# Patient Record
Sex: FEMALE | Employment: FULL TIME | URBAN - METROPOLITAN AREA
[De-identification: names, ages, dates, MRNs, and addresses within clinical notes are randomized per-mention and may not be internally consistent; named-entity substitution may affect disease eponyms.]

---

## 2019-10-01 ENCOUNTER — OFFICE VISIT (OUTPATIENT)
Dept: INTERNAL MEDICINE | Facility: CLINIC | Age: 49
End: 2019-10-01
Payer: OTHER GOVERNMENT

## 2019-10-01 VITALS
SYSTOLIC BLOOD PRESSURE: 121 MMHG | TEMPERATURE: 98 F | OXYGEN SATURATION: 99 % | HEIGHT: 66 IN | DIASTOLIC BLOOD PRESSURE: 79 MMHG | WEIGHT: 160 LBS | BODY MASS INDEX: 25.71 KG/M2 | HEART RATE: 75 BPM

## 2019-10-01 DIAGNOSIS — Z00.00 ROUTINE GENERAL MEDICAL EXAMINATION AT A HEALTH CARE FACILITY: Primary | ICD-10-CM

## 2019-10-01 DIAGNOSIS — F32.1 CURRENT MODERATE EPISODE OF MAJOR DEPRESSIVE DISORDER, UNSPECIFIED WHETHER RECURRENT: ICD-10-CM

## 2019-10-01 DIAGNOSIS — Z23 NEED FOR SHINGLES VACCINE: ICD-10-CM

## 2019-10-01 DIAGNOSIS — Z23 NEED FOR TDAP VACCINATION: ICD-10-CM

## 2019-10-01 PROCEDURE — 99386 PR PREVENTIVE VISIT,NEW,40-64: ICD-10-PCS | Mod: S$GLB,,, | Performed by: INTERNAL MEDICINE

## 2019-10-01 PROCEDURE — 99386 PREV VISIT NEW AGE 40-64: CPT | Mod: S$GLB,,, | Performed by: INTERNAL MEDICINE

## 2019-10-01 RX ORDER — CITALOPRAM 20 MG/1
20 TABLET, FILM COATED ORAL DAILY
Qty: 30 TABLET | Refills: 11 | Status: SHIPPED | OUTPATIENT
Start: 2019-10-01 | End: 2020-09-30

## 2019-10-01 NOTE — PROGRESS NOTES
Subjective:      Patient ID: Leonila Castelan is a 49 y.o. female.    Chief Complaint: Establish Care    HPI: patient with no significant past medical history is here to establish care and for wellness exam.     Patient report h/o thyroid problem for which she was given medication long ago. Patient does not remember what the problem was. Patient reports weight gain 10lbs  in a year, no dry skin, has hair loss, has constipation, no cold intolerance, occasional fatigue. Has poor sleep. Patient goes to bed at 10pm and wakes up 4:30. Patient reports she has nights when she sofi not sleep the whole night and then is tiered the rest of the day.     Patient reports depression x 6 years after loosing her son, who committed suicide and shot himself. Patient reports feeling depressed most of the days He was depressed. Patient has crying spells, lack of energy/interest, feeling guilt and worthlessness, No suicidal or homicidal ideation. Poor sleep but ok appetite. Patient reports decreased sexual desire and its effecting her relationships. Denies anxiety.    Review of Systems   Constitutional: Negative for chills, diaphoresis, fever, malaise/fatigue and weight loss.   HENT: Negative for congestion, ear pain, sinus pain, sore throat and tinnitus.    Eyes: Negative for blurred vision and photophobia.   Respiratory: Negative for cough, hemoptysis, shortness of breath and wheezing.    Cardiovascular: Negative for chest pain, palpitations, orthopnea, leg swelling and PND.   Gastrointestinal: Positive for constipation. Negative for abdominal pain, blood in stool, diarrhea, heartburn, melena, nausea and vomiting.   Genitourinary: Negative for dysuria, frequency and urgency.   Musculoskeletal: Negative for back pain, myalgias and neck pain.   Skin: Negative for rash.   Neurological: Negative for dizziness, tremors, seizures, loss of consciousness and weakness.   Endo/Heme/Allergies: Negative for polydipsia.   Psychiatric/Behavioral:  Positive for depression. Negative for hallucinations. The patient does not have insomnia.      Objective:     Physical Exam   Constitutional: She is oriented to person, place, and time. No distress.   Neck: No thyromegaly present.   Cardiovascular: Normal rate, regular rhythm and normal heart sounds.   No murmur heard.  Pulmonary/Chest: Effort normal and breath sounds normal. No respiratory distress. She has no wheezes.   Abdominal: Soft. Bowel sounds are normal. She exhibits no distension. There is no tenderness.   Musculoskeletal: She exhibits no edema.   Lymphadenopathy:     She has no cervical adenopathy.   Neurological: She is alert and oriented to person, place, and time.   Skin: She is not diaphoretic.   Psychiatric: Her behavior is normal. Judgment and thought content normal.   Patient is depressed + starts crying during interview talking about her son.      Assessment:       ICD-10-CM ICD-9-CM   1. Routine general medical examination at a health care facility Z00.00 V70.0   2. Need for Tdap vaccination Z23 V06.1   3. Need for shingles vaccine Z23 V04.89   4. Current moderate episode of major depressive disorder, unspecified whether recurrent F32.1 296.22       Plan:     Will order and will wellness exam labs + refer to GI for screening colonoscopy  Order mammogram + Tdap and shingle vaccine  Will use citalopram for depression.   Advised patient about possible side effect of the medication.   Patient sounds understanding.  Patient has previous history of questionable thyroid problem will check TSH, T3 and T4.

## 2019-10-17 LAB
ABS NRBC COUNT: 0 X 10 3/UL (ref 0–0.01)
ABSOLUTE BASOPHIL: 0.02 X 10 3/UL (ref 0–0.22)
ABSOLUTE EOSINOPHIL: 0.27 X 10 3/UL (ref 0.04–0.54)
ABSOLUTE IMMATURE GRAN: 0.01 X 10 3/UL (ref 0–0.04)
ABSOLUTE LYMPHOCYTE: 1.41 X 10 3/UL (ref 0.86–4.75)
ABSOLUTE MONOCYTE: 0.63 X 10 3/UL (ref 0.22–1.08)
ALBUMIN SERPL-MCNC: 4.4 G/DL (ref 3.5–5.2)
ALBUMIN/GLOB SERPL ELPH: 1.8 {RATIO} (ref 1–2.7)
ALP ISOS SERPL LEV INH-CCNC: 74 U/L (ref 35–105)
ALT (SGPT): 21 U/L (ref 0–33)
ANION GAP SERPL CALC-SCNC: 10 MMOL/L (ref 8–17)
AST SERPL-CCNC: 24 U/L (ref 0–32)
BASOPHILS NFR BLD: 0.4 % (ref 0.2–1.2)
BILIRUBIN, TOTAL: 0.18 MG/DL (ref 0–1.2)
BUN/CREAT SERPL: 16.7 (ref 6–20)
CALCIUM SERPL-MCNC: 9.2 MG/DL (ref 8.6–10.2)
CARBON DIOXIDE, CO2: 25 MMOL/L (ref 22–29)
CHLORIDE: 107 MMOL/L (ref 98–107)
CHOLEST SERPL-MSCNC: 156 MG/DL (ref 100–200)
CREAT SERPL-MCNC: 0.58 MG/DL (ref 0.5–0.9)
EOSINOPHIL NFR BLD: 5 % (ref 0.7–7)
GFR ESTIMATION: 110.04
GLOBULIN: 2.5 G/DL (ref 1.5–4.5)
GLUCOSE: 100 MG/DL (ref 74–106)
HCT VFR BLD AUTO: 29.4 % (ref 37–47)
HDLC SERPL-MCNC: 50 MG/DL
HGB BLD-MCNC: 8.3 G/DL (ref 12–16)
IMMATURE GRANULOCYTES: 0.2 % (ref 0–0.5)
LDL/HDL RATIO: 1.7 (ref 1–3)
LDLC SERPL CALC-MCNC: 84.4 MG/DL (ref 0–100)
LYMPHOCYTES NFR BLD: 25.9 % (ref 19.3–53.1)
MCH RBC QN AUTO: 21 PG (ref 27–32)
MCHC RBC AUTO-ENTMCNC: 28.2 G/DL (ref 32–36)
MCV RBC AUTO: 74.4 FL (ref 82–100)
MONOCYTES NFR BLD: 11.6 % (ref 4.7–12.5)
NEUTROPHILS ABSOLUTE COUNT: 3.11 X 10 3/UL (ref 2.15–7.56)
NEUTROPHILS NFR BLD: 56.9 %
NUCLEATED RED BLOOD CELLS: 0 /100 WBC (ref 0–0.2)
PLATELET # BLD AUTO: 495 X 10 3/UL (ref 135–400)
POTASSIUM: 4.4 MMOL/L (ref 3.5–5.1)
PROT SNV-MCNC: 6.9 G/DL (ref 6.4–8.3)
RBC # BLD AUTO: 3.95 X 10 6/UL (ref 4.2–5.4)
RDW-SD: 48.3 FL (ref 37–54)
SODIUM: 142 MMOL/L (ref 136–145)
T3FREE SERPL DIALY-MCNC: 2.55 PG/ML (ref 2–4.4)
T4, FREE: 0.94 NG/DL (ref 0.93–1.7)
TRIGL SERPL-MCNC: 108 MG/DL (ref 0–150)
TSH SERPL DL<=0.005 MIU/L-ACNC: 2.25 UIU/ML (ref 0.27–4.2)
UREA NITROGEN (BUN): 9.7 MG/DL (ref 6–20)
WBC # BLD: 5.45 X 10 3/UL (ref 4.3–10.8)

## 2019-10-18 ENCOUNTER — TELEPHONE (OUTPATIENT)
Dept: INTERNAL MEDICINE | Facility: CLINIC | Age: 49
End: 2019-10-18

## 2019-10-18 DIAGNOSIS — D50.0 IRON DEFICIENCY ANEMIA DUE TO CHRONIC BLOOD LOSS: Primary | ICD-10-CM

## 2019-10-18 DIAGNOSIS — D50.9 MICROCYTIC ANEMIA: Primary | ICD-10-CM

## 2019-10-18 LAB
%TRANSFERRIN SATURATION: 1.9 % (ref 20–50)
ABSOLUTE RETIC: 0.07 X 10 6/UL (ref 0.02–0.08)
FERRITIN: 4.89 NG/ML (ref 10–232)
IRON BINDING CAPACITY: 525 UG/DL (ref 262–472)
IRON SERPL-MCNC: 10 UG/DL (ref 37–145)
RETICULOCYTE COUNT: 1.9 % (ref 0.5–1.7)
UIBC SERPL-MCNC: 515 UG/DL (ref 112–306)

## 2019-10-18 RX ORDER — FERROUS SULFATE 325(65) MG
325 TABLET ORAL
Qty: 90 TABLET | Refills: 1 | Status: SHIPPED | OUTPATIENT
Start: 2019-10-18 | End: 2019-11-17

## 2019-10-18 NOTE — TELEPHONE ENCOUNTER
Notified pt of Hgb, rbc and iron sat levels, she states abnormal vag bleeding surgery scheduled with dr preston and no bleeding x one week, states sx of weakness, dizziness, fatigue, and shortness of breath- instructed to report to ER for transfusion- pt verb understanding states she will be there in 15 minutes, also called ER gave report to LEELA Soriano at [facility]

## 2019-10-21 ENCOUNTER — OFFICE VISIT (OUTPATIENT)
Dept: INTERNAL MEDICINE | Facility: CLINIC | Age: 49
End: 2019-10-21
Payer: OTHER GOVERNMENT

## 2019-10-21 VITALS
SYSTOLIC BLOOD PRESSURE: 124 MMHG | OXYGEN SATURATION: 99 % | BODY MASS INDEX: 26.2 KG/M2 | TEMPERATURE: 98 F | HEART RATE: 66 BPM | HEIGHT: 66 IN | DIASTOLIC BLOOD PRESSURE: 79 MMHG | WEIGHT: 163 LBS

## 2019-10-21 DIAGNOSIS — F32.1 CURRENT MODERATE EPISODE OF MAJOR DEPRESSIVE DISORDER, UNSPECIFIED WHETHER RECURRENT: ICD-10-CM

## 2019-10-21 DIAGNOSIS — E61.1 IRON DEFICIENCY: Primary | ICD-10-CM

## 2019-10-21 PROBLEM — D50.9 IRON DEFICIENCY ANEMIA: Status: ACTIVE | Noted: 2019-10-21

## 2019-10-21 PROBLEM — D21.9 LEIOMYOMA: Status: ACTIVE | Noted: 2019-10-21

## 2019-10-21 PROBLEM — N93.8 DYSFUNCTIONAL UTERINE BLEEDING: Status: ACTIVE | Noted: 2019-10-21

## 2019-10-21 PROCEDURE — 99214 PR OFFICE/OUTPT VISIT, EST, LEVL IV, 30-39 MIN: ICD-10-PCS | Mod: S$GLB,,, | Performed by: INTERNAL MEDICINE

## 2019-10-21 PROCEDURE — 99214 OFFICE O/P EST MOD 30 MIN: CPT | Mod: S$GLB,,, | Performed by: INTERNAL MEDICINE

## 2019-10-21 NOTE — PROGRESS NOTES
Subjective:      Patient ID: Leonila Castelan is a 49 y.o. female.    Chief Complaint: Hospital Follow Up    HPI: patient with no significant past medical history is here to establish care and for wellness exam.     Patient has h/o Uterine fibroids and heavy periods. Patient was to have iron deficiency anemia. Patient had a units PRBCs infusion on Friday. Patient reports she was having Headaches, fatigue, hypersomnia, felt dizzy and lightheaded, lack of energy, had some shortness of breath and almost went to ER because of that, had palpitation as well. Patient reports after transfusion HA are still same, still having shortness of breath but seem improved than before + fatigue is little better as well.     Patient reports depression x 6 years after loosing her son, who committed suicide and shot himself. Patient reports feeling depressed most of the days. Patient has crying spells, lack of energy/interest, sleeping most of the day. feeling guilt and worthlessness, No suicidal or homicidal ideation. Poor sleep but ok appetite. Patient reports decreased sexual desire and its effecting her relationships. Denies anxiety. Patient was given Citalopram but has not yet started the medicine.    Review of Systems   Constitutional: Negative for chills, diaphoresis, fever, malaise/fatigue and weight loss.   HENT: Negative for congestion, ear pain, sinus pain, sore throat and tinnitus.    Eyes: Negative for blurred vision and photophobia.   Respiratory: Negative for cough, hemoptysis, shortness of breath and wheezing.    Cardiovascular: Negative for chest pain, palpitations, orthopnea, leg swelling and PND.   Gastrointestinal: Positive for constipation. Negative for abdominal pain, blood in stool, diarrhea, heartburn, melena, nausea and vomiting.   Genitourinary: Negative for dysuria, frequency and urgency.   Musculoskeletal: Negative for back pain, myalgias and neck pain.   Skin: Negative for rash.   Neurological: Negative for  dizziness, tremors, seizures, loss of consciousness and weakness.   Endo/Heme/Allergies: Negative for polydipsia.   Psychiatric/Behavioral: Positive for depression. Negative for hallucinations. The patient does not have insomnia.      Objective:     Physical Exam   Constitutional: She is oriented to person, place, and time. No distress.   Neck: No thyromegaly present.   Cardiovascular: Normal rate, regular rhythm and normal heart sounds.   No murmur heard.  Pulmonary/Chest: Effort normal and breath sounds normal. No respiratory distress. She has no wheezes.   Abdominal: Soft. Bowel sounds are normal. She exhibits no distension. There is no tenderness.   Musculoskeletal: She exhibits no edema.   Lymphadenopathy:     She has no cervical adenopathy.   Neurological: She is alert and oriented to person, place, and time.   Skin: She is not diaphoretic.   Psychiatric: Her behavior is normal. Judgment and thought content normal.   Patient mood seem better than before.     Assessment:       ICD-10-CM ICD-9-CM   1. Iron deficiency E61.1 280.9   2. Current moderate episode of major depressive disorder, unspecified whether recurrent F32.1 296.22       Plan:     Patient still has symptoms suggestive of depression vs anemia  Patient has received 1 unit of PRBC with some help with symptoms.   Patient has iron saturation of less than 2%..  Transfusion should have improved iron saturation a little  Will give transfuse IV iron x 2.   Advised patient to take oral iron as well.   Patient is planning to have surgery for fibroids...  Will try to increase iron store fast  Discussed IV iron versus oral iron and there side effect including allergic reaction  Patient wants to try IV iron.  Will check CBC  advised patient to start citalopram

## 2019-10-22 LAB
ABS NRBC COUNT: 0 X 10 3/UL (ref 0–0.01)
ABSOLUTE BASOPHIL: 0.04 X 10 3/UL (ref 0–0.22)
ABSOLUTE EOSINOPHIL: 0.34 X 10 3/UL (ref 0.04–0.54)
ABSOLUTE IMMATURE GRAN: 0.03 X 10 3/UL (ref 0–0.04)
ABSOLUTE LYMPHOCYTE: 2.01 X 10 3/UL (ref 0.86–4.75)
ABSOLUTE MONOCYTE: 0.94 X 10 3/UL (ref 0.22–1.08)
BASOPHILS NFR BLD: 0.5 % (ref 0.2–1.2)
EOSINOPHIL NFR BLD: 3.9 % (ref 0.7–7)
HCT VFR BLD AUTO: 36.2 % (ref 37–47)
HGB BLD-MCNC: 10.7 G/DL (ref 12–16)
IMMATURE GRANULOCYTES: 0.3 % (ref 0–0.5)
LYMPHOCYTES NFR BLD: 23.1 % (ref 19.3–53.1)
MCH RBC QN AUTO: 22.4 PG (ref 27–32)
MCHC RBC AUTO-ENTMCNC: 29.6 G/DL (ref 32–36)
MCV RBC AUTO: 75.9 FL (ref 82–100)
MONOCYTES NFR BLD: 10.8 % (ref 4.7–12.5)
NEUTROPHILS ABSOLUTE COUNT: 5.34 X 10 3/UL (ref 2.15–7.56)
NEUTROPHILS NFR BLD: 61.4 %
NUCLEATED RED BLOOD CELLS: 0 /100 WBC (ref 0–0.2)
PLATELET # BLD AUTO: 475 X 10 3/UL (ref 135–400)
RBC # BLD AUTO: 4.77 X 10 6/UL (ref 4.2–5.4)
RDW-SD: 51.9 FL (ref 37–54)
WBC # BLD: 8.7 X 10 3/UL (ref 4.3–10.8)

## 2019-10-25 ENCOUNTER — TELEPHONE (OUTPATIENT)
Dept: INTERNAL MEDICINE | Facility: CLINIC | Age: 49
End: 2019-10-25

## 2019-10-25 NOTE — TELEPHONE ENCOUNTER
Pt called stated she haven't heard from the infusion center.. They need an order for ferahene instead of venofer...

## 2019-11-07 ENCOUNTER — OFFICE VISIT (OUTPATIENT)
Dept: INTERNAL MEDICINE | Facility: CLINIC | Age: 49
End: 2019-11-07
Payer: OTHER GOVERNMENT

## 2019-11-07 VITALS
TEMPERATURE: 99 F | WEIGHT: 160 LBS | HEART RATE: 100 BPM | BODY MASS INDEX: 25.71 KG/M2 | OXYGEN SATURATION: 99 % | DIASTOLIC BLOOD PRESSURE: 84 MMHG | HEIGHT: 66 IN | SYSTOLIC BLOOD PRESSURE: 121 MMHG

## 2019-11-07 DIAGNOSIS — J44.89 NOCTURNAL HYPOXEMIA DUE TO OBSTRUCTIVE CHRONIC BRONCHITIS: ICD-10-CM

## 2019-11-07 DIAGNOSIS — J20.9 ACUTE BRONCHITIS, UNSPECIFIED ORGANISM: Primary | ICD-10-CM

## 2019-11-07 DIAGNOSIS — G47.36 NOCTURNAL HYPOXEMIA DUE TO OBSTRUCTIVE CHRONIC BRONCHITIS: ICD-10-CM

## 2019-11-07 DIAGNOSIS — D50.0 IRON DEFICIENCY ANEMIA DUE TO CHRONIC BLOOD LOSS: ICD-10-CM

## 2019-11-07 PROCEDURE — 96372 THER/PROPH/DIAG INJ SC/IM: CPT | Mod: S$GLB,,, | Performed by: INTERNAL MEDICINE

## 2019-11-07 PROCEDURE — 99214 PR OFFICE/OUTPT VISIT, EST, LEVL IV, 30-39 MIN: ICD-10-PCS | Mod: 25,S$GLB,, | Performed by: INTERNAL MEDICINE

## 2019-11-07 PROCEDURE — 96372 PR INJECTION,THERAP/PROPH/DIAG2ST, IM OR SUBCUT: ICD-10-PCS | Mod: S$GLB,,, | Performed by: INTERNAL MEDICINE

## 2019-11-07 PROCEDURE — 99214 OFFICE O/P EST MOD 30 MIN: CPT | Mod: 25,S$GLB,, | Performed by: INTERNAL MEDICINE

## 2019-11-07 RX ORDER — ALBUTEROL SULFATE 90 UG/1
2 AEROSOL, METERED RESPIRATORY (INHALATION) EVERY 6 HOURS PRN
Qty: 18 G | Refills: 0 | Status: SHIPPED | OUTPATIENT
Start: 2019-11-07 | End: 2020-11-06

## 2019-11-07 RX ORDER — PREDNISONE 20 MG/1
20 TABLET ORAL DAILY
Qty: 7 TABLET | Refills: 0 | Status: SHIPPED | OUTPATIENT
Start: 2019-11-07

## 2019-11-07 RX ORDER — DOXYCYCLINE 100 MG/1
100 CAPSULE ORAL 2 TIMES DAILY
Qty: 14 CAPSULE | Refills: 0 | Status: SHIPPED | OUTPATIENT
Start: 2019-11-07

## 2019-11-07 RX ORDER — DEXAMETHASONE SODIUM PHOSPHATE 100 MG/10ML
10 INJECTION INTRAMUSCULAR; INTRAVENOUS ONCE
Status: COMPLETED | OUTPATIENT
Start: 2019-11-07 | End: 2019-11-07

## 2019-11-07 RX ADMIN — DEXAMETHASONE SODIUM PHOSPHATE 10 MG: 100 INJECTION INTRAMUSCULAR; INTRAVENOUS at 10:11

## 2019-11-07 NOTE — PROGRESS NOTES
Subjective:      Patient ID: Leonila Castelan is a 49 y.o. female.    Chief Complaint: Sinusitis; Sore Throat; Cough; and Fever    Patient has h/o Uterine fibroids and heavy periods. Patient was found to have iron deficiency anemia. Patient had a units PRBCs infusion and iron infusion was recommended but not yet done. It is scheduled tomorrow. Patient reports that after transfusion her HA and fatigue improved.     Patient reports depression x 6 years after loosing her son, who committed suicide and shot himself. Patient was started on Citalopram and patient reports much improved symptoms. Patient denies crying spells, lack of energy/interest, improved fatigue no. feeling guilt and worthlessness.    Pt reports nasal congestion, scratchy throat x 3 days. Also has ear pressure, postnasal drip, subjective fever, body aches. Also has cough that is productive with greenish sputum denies wheezing or sob- taking dayquil and nyquil otc- pts h/r elevated today.     Review of Systems   Constitutional: Positive for chills, fever and malaise/fatigue. Negative for diaphoresis and weight loss.   HENT: Positive for congestion, ear pain and sore throat. Negative for sinus pain and tinnitus.    Eyes: Negative for blurred vision and photophobia.   Respiratory: Positive for cough. Negative for hemoptysis, shortness of breath and wheezing.    Cardiovascular: Negative for chest pain, palpitations, orthopnea, leg swelling and PND.   Gastrointestinal: Positive for constipation. Negative for abdominal pain, blood in stool, diarrhea, heartburn, melena, nausea and vomiting.   Genitourinary: Negative for dysuria, frequency and urgency.   Musculoskeletal: Negative for back pain, myalgias and neck pain.   Skin: Negative for rash.   Neurological: Negative for dizziness, tremors, seizures, loss of consciousness and weakness.   Endo/Heme/Allergies: Negative for polydipsia.   Psychiatric/Behavioral: Positive for depression. Negative for hallucinations.  The patient does not have insomnia.      Objective:     Physical Exam   Constitutional: She is oriented to person, place, and time. No distress.   HENT:   Bilateral TM are clear.   Mild pharyngeal erythema.   Left maxillary sinus tenderness.   Neck: No thyromegaly present.   Cardiovascular: Normal rate, regular rhythm and normal heart sounds.   No murmur heard.  Pulmonary/Chest: Effort normal and breath sounds normal. No respiratory distress. She has no wheezes.   Diffuse wheezing in chest   Abdominal: Soft. Bowel sounds are normal. She exhibits no distension. There is no tenderness.   Musculoskeletal: She exhibits no edema.   Lymphadenopathy:     She has no cervical adenopathy.   Neurological: She is alert and oriented to person, place, and time.   Skin: She is not diaphoretic.   Psychiatric: Her behavior is normal. Judgment and thought content normal.   Patient mood seem better than before.     Assessment:       ICD-10-CM ICD-9-CM   1. Acute bronchitis, unspecified organism J20.9 466.0   2. Nocturnal hypoxemia due to obstructive chronic bronchitis J44.9 491.20    G47.36 327.26   3. Iron deficiency anemia due to chronic blood loss D50.0 280.0       Plan:     Patient depression is much improved.  Will continue citalopram  Patient has iron deficiency anemia and is scheduled for iron infusion tomorrow  Expecting improved symptoms after iron infusion  Patient today has symptoms and examination is suggestive of acute maxillary sinusitis and acute bronchitis  Will use steroid + doxycycline  Also use ProAir for wheezing.

## 2021-08-12 ENCOUNTER — PATIENT OUTREACH (OUTPATIENT)
Dept: ADMINISTRATIVE | Facility: HOSPITAL | Age: 51
End: 2021-08-12

## 2022-05-13 ENCOUNTER — PATIENT OUTREACH (OUTPATIENT)
Dept: ADMINISTRATIVE | Facility: HOSPITAL | Age: 52
End: 2022-05-13
Payer: OTHER GOVERNMENT

## 2022-05-13 NOTE — PROGRESS NOTES
Working gap report for pts not seen in the past 12 months or longer. Pt has moved back to [place] and [facility]. No recent results found to link. EMR updated.